# Patient Record
Sex: FEMALE | Race: WHITE
[De-identification: names, ages, dates, MRNs, and addresses within clinical notes are randomized per-mention and may not be internally consistent; named-entity substitution may affect disease eponyms.]

---

## 2018-11-07 ENCOUNTER — HOSPITAL ENCOUNTER (OUTPATIENT)
Dept: HOSPITAL 25 - OREAST | Age: 63
Discharge: HOME | End: 2018-11-07
Attending: SPECIALIST
Payer: COMMERCIAL

## 2018-11-07 VITALS — DIASTOLIC BLOOD PRESSURE: 56 MMHG | SYSTOLIC BLOOD PRESSURE: 149 MMHG

## 2018-11-07 DIAGNOSIS — Z87.891: ICD-10-CM

## 2018-11-07 DIAGNOSIS — E78.00: ICD-10-CM

## 2018-11-07 DIAGNOSIS — K21.9: ICD-10-CM

## 2018-11-07 DIAGNOSIS — E78.5: ICD-10-CM

## 2018-11-07 DIAGNOSIS — I10: ICD-10-CM

## 2018-11-07 DIAGNOSIS — H25.811: Primary | ICD-10-CM

## 2018-11-07 DIAGNOSIS — E11.9: ICD-10-CM

## 2018-11-07 DIAGNOSIS — Z79.84: ICD-10-CM

## 2018-11-07 DIAGNOSIS — G47.33: ICD-10-CM

## 2018-11-07 PROCEDURE — V2632 POST CHMBR INTRAOCULAR LENS: HCPCS

## 2018-11-08 NOTE — OP
DATE OF OPERATION:  11/07/18 [facility]

 

DATE OF BIRTH:  [birth year]

 

SURGEON:  Naman Bah M.D.

 

PREOPERATIVE DIAGNOSIS:  Cataract right eye.

 

POSTOPERATIVE DIAGNOSIS:  Cataract right eye.

 

OPERATIVE PROCEDURE:  Extracapsular cataract extraction with intraocular lens 
implant right eye.

 

DESCRIPTION OF PROCEDURE:  The patient was brought to the operating room after 
being given 1/2% Alcaine with epinephrine drops in the preoperative area.  The 
eye was prepped and draped in the usual sterile fashion.  Sterile drape and 
eyelid speculum were placed.  Again, topical 1/2% Alcaine with epinephrine was 
given.  A paracentesis incision was made at the 9 o'clock position with the 
No.75 blade.  Clear cornea incision 2.2 x 2.2-mm was created at the 12 o'clock 
position starting at the anterior limbus using the 2.2-mm keratome.  The 
anterior chamber was irrigated with 0.4 mL of 1% non-preservative intracameral 
lidocaine and filled with DisCoVisc.  A capsulorrhexis was completed using the 
cystotome and the Utrata forceps. Hydrodissection was performed with balanced 
salt solution.  The lens nucleus was removed with the Phacoemulsification 
handpiece without incident.  Cortex was removed with the irrigation-aspiration 
handpiece.  The capsular bag was re-inflated using DisCoVisc and an SN60WF 21.5 
implant was inserted with the shooter. The irrigation-aspiration handpiece was 
used to remove all residual DisCoVisc. The eye was refilled with balanced salt 
solution and the wound checked and found to be watertight.  Topical Maxitrol 
drops were given.

 

 [id number]/[facility] #: [id number]

MTDD

## 2018-11-14 ENCOUNTER — HOSPITAL ENCOUNTER (OUTPATIENT)
Dept: HOSPITAL 25 - OREAST | Age: 63
Discharge: HOME | End: 2018-11-14
Attending: SPECIALIST
Payer: COMMERCIAL

## 2018-11-14 VITALS — DIASTOLIC BLOOD PRESSURE: 59 MMHG | SYSTOLIC BLOOD PRESSURE: 136 MMHG

## 2018-11-14 DIAGNOSIS — J30.2: ICD-10-CM

## 2018-11-14 DIAGNOSIS — Z87.891: ICD-10-CM

## 2018-11-14 DIAGNOSIS — E11.9: ICD-10-CM

## 2018-11-14 DIAGNOSIS — K21.9: ICD-10-CM

## 2018-11-14 DIAGNOSIS — I11.9: ICD-10-CM

## 2018-11-14 DIAGNOSIS — Z79.84: ICD-10-CM

## 2018-11-14 DIAGNOSIS — G47.33: ICD-10-CM

## 2018-11-14 DIAGNOSIS — E78.00: ICD-10-CM

## 2018-11-14 DIAGNOSIS — M19.90: ICD-10-CM

## 2018-11-14 DIAGNOSIS — H25.812: Primary | ICD-10-CM

## 2018-11-14 DIAGNOSIS — I27.20: ICD-10-CM

## 2018-11-14 PROCEDURE — V2632 POST CHMBR INTRAOCULAR LENS: HCPCS

## 2018-11-15 NOTE — OP
DATE OF OPERATION:  11/14/18 [facility]

 

DATE OF BIRTH:  [birth year]

 

SURGEON:  Naman Bah M.D.

 

PREOPERATIVE DIAGNOSIS:  Cataract, left eye.

 

POSTOPERATIVE DIAGNOSIS:  Cataract, left eye.

 

OPERATIVE PROCEDURE:  Extracapsular cataract extraction with intraocular lens 
implant left eye.

 

DESCRIPTION OF PROCEDURE:  The patient was brought to the operating room after 
being given 1/2% Alcaine with epinephrine drops in the preoperative area.  The 
eye was prepped and draped in the usual sterile fashion.  Sterile drape and 
eyelid speculum were placed.  Again, topical 1/2% Alcaine with epinephrine was 
given.  A paracentesis incision was made at the 3 o'clock position with the 
No.75 blade.  Clear cornea incision 2.2 x 2.2-mm was created at the 6 o'clock 
position starting at the anterior limbus using the 2.2-mm keratome.  The 
anterior chamber was irrigated with 0.4 mL of 1% non-preservative intracameral 
lidocaine and filled with DisCoVisc.  A capsulorrhexis was completed using the 
cystotome and the Utrata forceps. Hydrodissection was performed with balanced 
salt solution.  The lens nucleus was removed with the Phacoemulsification 
handpiece without incident.  Cortex was removed with the irrigation-aspiration 
handpiece.  The capsular bag was re-inflated using DisCoVisc and an SN60WF 21.5 
implant was inserted with the shooter. The irrigation-aspiration handpiece was 
used to remove all residual DisCoVisc.  The eye was refilled with balanced salt 
solution and the wound checked and found to be watertight.  Topical Maxitrol 
drops were given.

 

 [id number]/[facility] #: [id number]

[facility]